# Patient Record
Sex: FEMALE | Race: WHITE | URBAN - METROPOLITAN AREA
[De-identification: names, ages, dates, MRNs, and addresses within clinical notes are randomized per-mention and may not be internally consistent; named-entity substitution may affect disease eponyms.]

---

## 2017-01-09 ENCOUNTER — TRANSFERRED RECORDS (OUTPATIENT)
Dept: HEALTH INFORMATION MANAGEMENT | Facility: CLINIC | Age: 47
End: 2017-01-09

## 2017-10-07 ENCOUNTER — TRANSFERRED RECORDS (OUTPATIENT)
Dept: HEALTH INFORMATION MANAGEMENT | Facility: CLINIC | Age: 47
End: 2017-10-07

## 2017-10-11 ENCOUNTER — TRANSFERRED RECORDS (OUTPATIENT)
Dept: HEALTH INFORMATION MANAGEMENT | Facility: CLINIC | Age: 47
End: 2017-10-11

## 2017-12-11 ENCOUNTER — TRANSFERRED RECORDS (OUTPATIENT)
Dept: HEALTH INFORMATION MANAGEMENT | Facility: CLINIC | Age: 47
End: 2017-12-11

## 2017-12-26 ENCOUNTER — TRANSFERRED RECORDS (OUTPATIENT)
Dept: HEALTH INFORMATION MANAGEMENT | Facility: CLINIC | Age: 47
End: 2017-12-26

## 2018-02-06 ENCOUNTER — MEDICAL CORRESPONDENCE (OUTPATIENT)
Dept: HEALTH INFORMATION MANAGEMENT | Facility: CLINIC | Age: 48
End: 2018-02-06

## 2018-02-08 ENCOUNTER — MEDICAL CORRESPONDENCE (OUTPATIENT)
Dept: HEALTH INFORMATION MANAGEMENT | Facility: CLINIC | Age: 48
End: 2018-02-08

## 2018-02-28 DIAGNOSIS — H53.10 SUBJECTIVE VISUAL DISTURBANCE: Primary | ICD-10-CM

## 2018-03-01 ENCOUNTER — OFFICE VISIT (OUTPATIENT)
Dept: OPHTHALMOLOGY | Facility: CLINIC | Age: 48
End: 2018-03-01
Attending: OPHTHALMOLOGY
Payer: MEDICARE

## 2018-03-01 DIAGNOSIS — H46.9 OPTIC NEUROPATHY: Primary | ICD-10-CM

## 2018-03-01 DIAGNOSIS — H57.11 PAIN IN OR AROUND EYE, RIGHT: ICD-10-CM

## 2018-03-01 DIAGNOSIS — H53.10 SUBJECTIVE VISUAL DISTURBANCE: ICD-10-CM

## 2018-03-01 PROCEDURE — 92133 CPTRZD OPH DX IMG PST SGM ON: CPT | Mod: ZF | Performed by: OPHTHALMOLOGY

## 2018-03-01 PROCEDURE — 92083 EXTENDED VISUAL FIELD XM: CPT | Mod: ZF | Performed by: OPHTHALMOLOGY

## 2018-03-01 PROCEDURE — G0463 HOSPITAL OUTPT CLINIC VISIT: HCPCS | Mod: ZF | Performed by: TECHNICIAN/TECHNOLOGIST

## 2018-03-01 ASSESSMENT — VISUAL ACUITY
OS_CC+: -3
OS_CC: 20/25
CORRECTION_TYPE: GLASSES
METHOD: SNELLEN - LINEAR
OD_CC: 20/30

## 2018-03-01 ASSESSMENT — REFRACTION_WEARINGRX
OS_CYLINDER: +0.25
OD_SPHERE: -2.75
OD_CYLINDER: SPHERE
OS_SPHERE: -3.00
OS_AXIS: 150

## 2018-03-01 ASSESSMENT — TONOMETRY
OS_IOP_MMHG: 15
IOP_METHOD: TONOPEN
OD_IOP_MMHG: 15

## 2018-03-01 ASSESSMENT — SLIT LAMP EXAM - LIDS
COMMENTS: NORMAL
COMMENTS: NORMAL

## 2018-03-01 ASSESSMENT — CUP TO DISC RATIO
OD_RATIO: 0.8
OS_RATIO: 0.8

## 2018-03-01 ASSESSMENT — CONF VISUAL FIELD
METHOD: COUNTING FINGERS
OD_NORMAL: 1
OS_NORMAL: 1

## 2018-03-01 ASSESSMENT — EXTERNAL EXAM - LEFT EYE: OS_EXAM: NORMAL

## 2018-03-01 ASSESSMENT — EXTERNAL EXAM - RIGHT EYE: OD_EXAM: 1 MM PTOSIS

## 2018-03-01 NOTE — MR AVS SNAPSHOT
After Visit Summary   3/1/2018    Gretel Arce    MRN: [id number]           Patient Information     Date Of Birth          1970        Visit Information        Provider Department      3/1/2018 10:30 AM Maciej Osorio [facility]        Today's Diagnoses     Optic neuropathy - Left Eye    -  1    Subjective visual disturbance        Pain in or around eye, right          Care Instructions    You have been diagnosed with Dry eye syndrome.  Symptoms of Dry eye syndrome can include double vision, eye pain, blurry vision, stinging, burning, tearing, eye redness.      Some useful instructions are listed below:     1.  Use artificial tears on a regular basis.  If you use artificial tear drops with preservative, you can use them up to 4-6 times per day. If you use artificial tear drops without preservatives, then you can use them more often.      2.  Wash your eyelashes with hot water.  Do not make the water so hot that you burn yourself, but the water should be more than just warm.  Often patients will wash their eyelashes in the shower under the hot water.  You should rub gently along the base of your eyelashes.      3.  Taking fish oil capsules twice a day for a month and then once a day after that can help improve Dry eye symptoms.      4.  Put lacrilube or refresh pm in your eyes before bedtime.      5.  If this is not beneficial, other treatments can include punctal plugs or cautery, corticosteroid eye drops, Restasis, Lipiflow, or autologous serum.  If you are still struggling with dry eye symptoms, call Dr. Osorio's office for other therapies or a referral to a dry eye specialist.                 Follow-ups after your visit        Who to contact     Please call your clinic at [phone] to:    Ask questions about your health    Make or cancel appointments    Discuss your medicines    Learn about your test results    Speak to your doctor            Additional Information About Your  Visit        [facility] Information     [facility] gives you secure access to your electronic health record. If you see a primary care provider, you can also send messages to your care team and make appointments. If you have questions, please call your primary care clinic.  If you do not have a primary care provider, please call [phone] and they will assist you.      [facility] is an electronic gateway that provides easy, online access to your medical records. With [facility], you can request a clinic appointment, read your test results, renew a prescription or communicate with your care team.     To access your existing account, please contact your [facility] or call [phone] for assistance.        Care EveryWhere ID     This is your Care EveryWhere ID. This could be used by other organizations to access your [place] medical records  [id number]         Blood Pressure from Last 3 Encounters:   08/16/16 123/80   05/26/16 146/75   05/16/16 100/64    Weight from Last 3 Encounters:   08/16/16 92.3 kg (203 lb 6.4 oz)   05/26/16 93.7 kg (206 lb 8 oz)   05/16/16 93.4 kg (206 lb)              We Performed the Following     Glaucoma Top OU     IOP Measurement     OCT Optic Nerve RNFL Spectralis OU (both eyes)          Today's Medication Changes          These changes are accurate as of 3/1/18 11:55 AM.  If you have any questions, ask your nurse or doctor.               Stop taking these medicines if you haven't already. Please contact your care team if you have questions.     ACCU-CHEK JOANNE PLUS test strip   Generic drug:  blood glucose monitoring   Stopped by:  Maciej Osorio MD           B-D U/F 31G X 8 MM   Generic drug:  insulin pen needle   Stopped by:  Maciej Osorio MD           loratadine 10 MG tablet   Commonly known as:  CLARITIN   Stopped by:  Maciej Osorio MD           NOVOLOG MIX 70/30 SC   Stopped by:  Maciej Osorio MD           VICTOZA SC    Stopped by:  Maciej Osorio MD                    Primary Care Provider Office Phone # Fax #    [facility] Wilson Tj Upton [phone]       [address] [place] [address] [address] [place] [address]        Equal Access to Services     ALICECHARY CANDACE : Hadii katerina coles dwayneo Sojuliali, waaxda luqadaha, qaybta kaalmada adeegyada, waxanastacia lorenain haysridharn gemmamike piedra arleen ramos. So [facility] [phone].    ATENCIÓN: Si habla español, tiene a botello disposición servicios gratuitos de asistencia lingüística. [facility] [phone].    We comply with applicable federal civil rights laws and [place] laws. We do not discriminate on the basis of race, color, national origin, age, disability, sex, sexual orientation, or gender identity.            Thank you!     Thank you for choosing [facility]  for your care. Our goal is always to provide you with excellent care. Hearing back from our patients is one way we can continue to improve our services. Please take a few minutes to complete the written survey that you may receive in the mail after your visit with us. Thank you!             Your Updated Medication List - Protect others around you: Learn how to safely use, store and throw away your medicines at www.disposemymeds.org.          This list is accurate as of 3/1/18 11:55 AM.  Always use your most recent med list.                   Brand Name Dispense Instructions for use Diagnosis    ACETAMINOPHEN EXTRA STRENGTH 500 MG tablet   Generic drug:  acetaminophen      Take 1-2 tablets by mouth 4 times daily as needed        amLODIPine 5 MG tablet    NORVASC     Take 1 tablet by mouth daily. Needs appointment for additional refills        aspirin 81 MG tablet      Take 1 tablet by mouth daily.        ATORVASTATIN CALCIUM PO      Take 80 mg by mouth daily        buPROPion 150 MG 24 hr tablet    WELLBUTRIN XL     Take 150 mg by mouth every morning Take three tablets in AM        CALTRATE 600+D 600-400 MG-UNIT per tablet   Generic  drug:  calcium-vitamin D      Take as directed per package instructions        cetirizine 10 MG tablet    zyrTEC     Take 20 mg by mouth daily        clonazePAM 0.5 MG tablet    klonoPIN     0.5 mg Take 1-2 tablets as needed.        clotrimazole 1 % cream    LOTRIMIN     Take inch apply as needed        fluocinonide 0.05 % cream    LIDEX     Apply topically 2 times daily as needed        FLUTICASONE PROPIONATE (NASAL) NA      Spray 2 sprays in nostril        ibuprofen 800 MG tablet    ADVIL/MOTRIN     Take 1 tablet by mouth every 8 hours as needed        ketoconazole 2 % shampoo    NIZORAL     Apply topically daily as needed for itching or irritation        LEXAPRO 20 MG tablet   Generic drug:  escitalopram      Take 40 mg by mouth daily        lisinopril 40 MG tablet    PRINIVIL/ZESTRIL     Take 1 tablet by mouth daily.        magnesium oxide 400 MG tablet    MAG-OX     Take 2 tablets by mouth daily.        metFORMIN 1000 MG tablet    GLUCOPHAGE     Take 1 tablet by mouth 2 times daily.        metoprolol tartrate 25 MG tablet    LOPRESSOR     Take 50 mg by mouth 2 times daily        multivitamin per tablet      Take 1 tablet by mouth daily.        NEXIUM PO      Take 20 mg by mouth        PROAIR  (90 BASE) MCG/ACT Inhaler   Generic drug:  albuterol      Inhale 2 puffs into the lungs. Every 3 to 6 hours as needed        ROZEREM 8 MG tablet   Generic drug:  ramelteon      Take 32 mg by mouth At Bedtime        TRAZODONE HCL PO      Take 200 mg by mouth At Bedtime        VITAMIN D3 PO      Take 1,000 Units by mouth daily

## 2018-03-01 NOTE — NURSING NOTE
Chief Complaints and History of Present Illnesses   Patient presents with     New Patient     referred for eye pain and vision changes      HPI    Symptoms:              Comments:  Referred for eye pain and vision changes s/p left optic decompression and aneurysm wrapped.     Patient states pain behind RIGHT eye and face after surgery.  Persisting pain on right side and no relief with ibuprofen or tylenol per patient.   Stinging sensation in right eye per patient.   Patient states she is shaping shapes out of her LEFT eye after surgery.  Occipital neuralgia diagnosed recently.   Denies double vision.   NURY Cervantes 3/1/2018 10:34 AM

## 2018-03-01 NOTE — PROGRESS NOTES
Assessment & Plan     Gretel Arce is a 47 year old female with the following diagnoses:   1. Optic neuropathy - Left Eye    2. Subjective visual disturbance    3. Pain in or around eye, right      She is complaining of supraorbital right sided pain and foreign body sensation since surgery. It is 8/10 in severity. It is always present, but improves when closing eyes. She notices a temporal clouding in her left eye when prompted. On 12/11/17 she had a right orbital cranial approach with decompresion of the left optic nerve and microsurgical clipping of paraclinioid aneurysm. Symptoms began in the fall after severe headache. 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   She also has a history of occiptial neuralgia and she is receiving injection tomorrow.     On exam, vision is 20/30 in the right, 20/25- in the left with myopic correction. Her extraocular motility is full. She has anisicoria with a smaller right pupil than left. She has a left afferent pupillary defect. Fields are full to confrontation. She has decreased color plates in the left eye 9/11. She has full color plates in the right.  On exam, She has mild ptosis of the right eyelid. she has mild punctate epithelial erosions in her right eye inferiorly which is improved with proparacaine. Her dilated exam is significant for 0.7 cup to disc ratio bilaterally. She has trace pallor bilaterally.     OCT indicates normal right eye and diffuse thinning in the left eye. Visual field shows nonspecific superior defect in the right eye and  circumferential constriction in the left eye. N    I believe her eye pain symptoms are surface related based on her complete resolution with proparacaine. I recommend aggressive lubrication, warm compress, fish oil supplements, lid scrubs and humidified air.     Incidentally, she has a left optic neuropathy.  She was not symptomatic so we do not know if this occurred from the aneurysm or  aneurysm repair.  In either case, she is not bothered by it, and we have established a new baseline for her should things change in the future.  Recommend follow up 1 year sooner as needed for worsening symptoms.             Attending Physician Attestation:  Complete documentation of historical and exam elements from today's encounter can be found in the full encounter summary report (not reduplicated in this progress note).  I personally obtained the chief complaint(s) and history of present illness.  I confirmed and edited as necessary the review of systems, past medical/surgical history, family history, social history, and examination findings as documented by others; and I examined the patient myself.  I personally reviewed the relevant tests, images, and reports as documented above.  I formulated and edited as necessary the assessment and plan and discussed the findings and management plan with the patient and family. - Maciej So MD  PGY3, Dept of Ophthalmology  Pager [phone]

## 2018-03-01 NOTE — LETTER
3/1/2018        Enio Mcmahon MD     RE:  :  MRN: Gretel Arce  1970  [id number]     Dear Dr. Mcmahon:    Thank you for asking me to see your very pleasant patient, Gretel Arce, in neuro-ophthalmic consultation.  I would like to thank you for sending your records and I have summarized them in the history of present illness.  My assessment and plan are below.  For further details, please see my attached clinic note.      Assessment & Plan   Gretel Arce is a 47 year old female with the following diagnoses:   1. Optic neuropathy - Left Eye    2. Subjective visual disturbance    3. Pain in or around eye, right      She is complaining of supraorbital right sided pain and foreign body sensation since surgery. It is 8/10 in severity. It is always present, but improves when closing eyes. She notices a temporal clouding in her left eye when prompted. On 17 she had a right orbital cranial approach with decompresion of the left optic nerve and microsurgical clipping of paraclinioid aneurysm. Symptoms began in the fall after severe headache. 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   She also has a history of occiptial neuralgia and she is receiving injection tomorrow.     On exam, vision is 20/30 in the right, 20/25- in the left with myopic correction. Her extraocular motility is full. She has a left afferent pupillary defect. Fields are full to confrontation. She has decreased color plates in the left eye . She has full color plates in the right.  On exam, she has mild punctate epithelial erosions in her right eye inferiorly which is improved with proparacaine. Her dilated exam is significant for 0.7 cup to disc ratio bilaterally. She has trace pallor bilaterally.     OCT indicates normal right eye and diffuse thinning in the left eye. Visual field shows nonspecific superior defect in the right eye and  circumferential constriction in the left eye.  N    I believe her eye pain symptoms are surface related based on her complete resolution with proparacaine. I recommend aggressive lubrication, warm compress, fish oil supplements, lid scrubs and humidified air.     Incidentally, she has a left optic neuropathy.  She was not symptomatic so we do not know if this occurred from the aneurysm or aneurysm repair.  In either case, she is not bothered by it, and we have established a new baseline for her should things change in the future.  Recommend follow up 1 year sooner as needed for worsening symptoms.      Again, thank you for allowing me to participate in the care of your patient.      Sincerely,    Maciej Osorio MD  Professor, Neuro-Ophthalmology  Department of Ophthalmology and Visual Neurosciences  [facility]    CC:  Heaven Upton MD    DX = eye pain, Dry eye syndrome, optic neuropathy, ophthalmic artery aneurysm

## 2018-03-01 NOTE — PATIENT INSTRUCTIONS
You have been diagnosed with Dry eye syndrome.  Symptoms of Dry eye syndrome can include double vision, eye pain, blurry vision, stinging, burning, tearing, eye redness.      Some useful instructions are listed below:     1.  Use artificial tears on a regular basis.  If you use artificial tear drops with preservative, you can use them up to 4-6 times per day. If you use artificial tear drops without preservatives, then you can use them more often.      2.  Wash your eyelashes with hot water.  Do not make the water so hot that you burn yourself, but the water should be more than just warm.  Often patients will wash their eyelashes in the shower under the hot water.  You should rub gently along the base of your eyelashes.      3.  Taking fish oil capsules twice a day for a month and then once a day after that can help improve Dry eye symptoms.      4.  Put lacrilube or refresh pm in your eyes before bedtime.      5.  If this is not beneficial, other treatments can include punctal plugs or cautery, corticosteroid eye drops, Restasis, Lipiflow, or autologous serum.  If you are still struggling with dry eye symptoms, call Dr. Osorio's office for other therapies or a referral to a dry eye specialist.

## 2018-11-02 ENCOUNTER — MYC MEDICAL ADVICE (OUTPATIENT)
Dept: OPHTHALMOLOGY | Facility: CLINIC | Age: 48
End: 2018-11-02

## 2018-11-02 NOTE — TELEPHONE ENCOUNTER
Reviewed with Dr. Neal  Recommend eval/treatment for dry eyes with comprehensive    Scheduled next week on pt preferred weds with Dr. Domingo Morocho RN 4:40 PM 11/02/18

## 2018-11-07 ENCOUNTER — OFFICE VISIT (OUTPATIENT)
Dept: OPHTHALMOLOGY | Facility: CLINIC | Age: 48
End: 2018-11-07
Attending: OPHTHALMOLOGY
Payer: MEDICARE

## 2018-11-07 DIAGNOSIS — H04.123 DRY EYE SYNDROME, BILATERAL: Primary | ICD-10-CM

## 2018-11-07 PROCEDURE — 68761 CLOSE TEAR DUCT OPENING: CPT | Mod: ZF | Performed by: STUDENT IN AN ORGANIZED HEALTH CARE EDUCATION/TRAINING PROGRAM

## 2018-11-07 PROCEDURE — G0463 HOSPITAL OUTPT CLINIC VISIT: HCPCS | Mod: ZF

## 2018-11-07 ASSESSMENT — REFRACTION_WEARINGRX
OD_CYLINDER: +0.00
OS_CYLINDER: +0.25
OS_SPHERE: -3.00
OD_ADD: +0.00
OD_SPHERE: -2.75
OS_AXIS: 150
OD_AXIS: 000

## 2018-11-07 ASSESSMENT — TONOMETRY
IOP_METHOD: TONOPEN
OD_IOP_MMHG: 18
OS_IOP_MMHG: 20

## 2018-11-07 ASSESSMENT — CONF VISUAL FIELD
OD_NORMAL: 1
OS_NORMAL: 1

## 2018-11-07 ASSESSMENT — SLIT LAMP EXAM - LIDS
COMMENTS: NORMAL
COMMENTS: NORMAL

## 2018-11-07 ASSESSMENT — VISUAL ACUITY
OD_CC: 20/25
OS_CC+: -2
METHOD: SNELLEN - LINEAR
OD_CC+: +1
CORRECTION_TYPE: GLASSES
OS_CC: 20/25

## 2018-11-07 ASSESSMENT — EXTERNAL EXAM - RIGHT EYE: OD_EXAM: 1 MM PTOSIS

## 2018-11-07 ASSESSMENT — EXTERNAL EXAM - LEFT EYE: OS_EXAM: NORMAL

## 2018-11-07 NOTE — PROGRESS NOTES
HPI: Gretel Arce is a 48 year old female who presents for general visit. She was referred by Dr. Osorio for evaluation of dry eyes. She notes that it was worse since her surgery in December 2017. Reports FBS, itching, and scratching. Denies change in vision, diplopia, flashes/floaters, xerostomia.     POHx:  Left paraophthalmic ICA aneurysm s/p clipping    Current Eye Medications:   Artificial tears four times daily both eyes     Review of Testing:  NA    Assessment & Plan   Gretel Arce is a 48 year old female with the following diagnoses:     1. Dry eye syndrome, bilateral:   -Using Artificial  Tears four times daily both eyes and warm compresses twice daily both eyes    -Discussed humidifier use   -Continue artificial tears and warm compresses   -Lower punctal plugs placed    Return to clinic in 3 months. Discussed with Dr. Cornejo.    Maciej Lane MD  Ophthalmology, PGY-4    Teaching statement:  Complete documentation of historical and exam elements from today's encounter can be found in the full encounter summary report (not reduplicated in this progress note). I personally obtained the chief complaint(s) and history of present illness.  I confirmed and edited as necessary the review of systems, past medical/surgical history, family history, social history, and examination findings as documented by others; and I examined the patient myself. I personally reviewed the relevant tests, images, and reports as documented above.     I formulated and edited as necessary the assessment and plan and discussed the findings and management plan with the patient and family.    Ingris Cornejo MD  Comprehensive Ophthalmology & Ocular Pathology  Department of Ophthalmology and Visual Neurosciences  [email or web address]  Pager [phone]

## 2018-11-07 NOTE — MR AVS SNAPSHOT
After Visit Summary   11/7/2018    Gretel Arce    MRN: [id number]           Patient Information     Date Of Birth          1970        Visit Information        Provider Department      11/7/2018 12:45 PM Maciej Lane MD [facility]        Today's Diagnoses     Dry eye syndrome, bilateral    -  1       Follow-ups after your visit        Follow-up notes from your care team     Return in about 3 months (around 2/7/2019) for Follow Up; resident cc.      Who to contact     Please call your clinic at [phone] to:    Ask questions about your health    Make or cancel appointments    Discuss your medicines    Learn about your test results    Speak to your doctor            Additional Information About Your Visit        [facility] Information     [facility] gives you secure access to your electronic health record. If you see a primary care provider, you can also send messages to your care team and make appointments. If you have questions, please call your primary care clinic.  If you do not have a primary care provider, please call [phone] and they will assist you.      [facility] is an electronic gateway that provides easy, online access to your medical records. With [facility], you can request a clinic appointment, read your test results, renew a prescription or communicate with your care team.     To access your existing account, please contact your [facility] or call [phone] for assistance.        Care EveryWhere ID     This is your Care EveryWhere ID. This could be used by other organizations to access your [place] medical records  [id number]         Blood Pressure from Last 3 Encounters:   08/16/16 123/80   05/26/16 146/75   05/16/16 100/64    Weight from Last 3 Encounters:   08/16/16 92.3 kg (203 lb 6.4 oz)   05/26/16 93.7 kg (206 lb 8 oz)   05/16/16 93.4 kg (206 lb)              We Performed the Following     Punctal Closure, Plugs        Primary  Care Provider Office Phone # Fax #    [facility] Katie Upton [phone] [address] [facility] [address]        Equal Access to Services     MARLENA MARIA : Hadcelso katerina coles dwayneo Sojuliali, waaxda luqadaha, qaybta kaalmada adegeoffrey, ramo coultercitlaly ramos. So [facility] [phone].    ATENCIÓN: Si habla español, tiene a botello disposición servicios gratuitos de asistencia lingüística. Llame al [phone].    We comply with applicable federal civil rights laws and [place] laws. We do not discriminate on the basis of race, color, national origin, age, disability, sex, sexual orientation, or gender identity.            Thank you!     Thank you for choosing [facility]  for your care. Our goal is always to provide you with excellent care. Hearing back from our patients is one way we can continue to improve our services. Please take a few minutes to complete the written survey that you may receive in the mail after your visit with us. Thank you!             Your Updated Medication List - Protect others around you: Learn how to safely use, store and throw away your medicines at www.disposemymeds.org.          This list is accurate as of 11/7/18  5:54 PM.  Always use your most recent med list.                   Brand Name Dispense Instructions for use Diagnosis    ACETAMINOPHEN EXTRA STRENGTH 500 MG tablet   Generic drug:  acetaminophen      Take 1-2 tablets by mouth 4 times daily as needed        amLODIPine 5 MG tablet    NORVASC     Take 1 tablet by mouth daily. Needs appointment for additional refills        aspirin 81 MG tablet      Take 1 tablet by mouth daily.        ATORVASTATIN CALCIUM PO      Take 80 mg by mouth daily        buPROPion 150 MG 24 hr tablet    WELLBUTRIN XL     Take 150 mg by mouth every morning Take three tablets in AM        CALTRATE 600+D 600-400 MG-UNIT per tablet   Generic drug:  calcium carbonate 600 mg-vitamin D 400 units      Take as directed per  package instructions        cetirizine 10 MG tablet    zyrTEC     Take 20 mg by mouth daily        clonazePAM 0.5 MG tablet    klonoPIN     0.5 mg Take 1-2 tablets as needed.        clotrimazole 1 % cream    LOTRIMIN     Take inch apply as needed        fluocinonide 0.05 % cream    LIDEX     Apply topically 2 times daily as needed        FLUTICASONE PROPIONATE (NASAL) NA      Spray 2 sprays in nostril        ibuprofen 800 MG tablet    ADVIL/MOTRIN     Take 1 tablet by mouth every 8 hours as needed        ketoconazole 2 % shampoo    NIZORAL     Apply topically daily as needed for itching or irritation        LEXAPRO 20 MG tablet   Generic drug:  escitalopram      Take 40 mg by mouth daily        lisinopril 40 MG tablet    PRINIVIL/ZESTRIL     Take 1 tablet by mouth daily.        magnesium oxide 400 MG tablet    MAG-OX     Take 2 tablets by mouth daily.        metFORMIN 1000 MG tablet    GLUCOPHAGE     Take 1 tablet by mouth 2 times daily.        metoprolol tartrate 25 MG tablet    LOPRESSOR     Take 50 mg by mouth 2 times daily        multivitamin per tablet      Take 1 tablet by mouth daily.        NEXIUM PO      Take 20 mg by mouth        PROAIR  (90 Base) MCG/ACT inhaler   Generic drug:  albuterol      Inhale 2 puffs into the lungs. Every 3 to 6 hours as needed        ROZEREM 8 MG tablet   Generic drug:  ramelteon      Take 32 mg by mouth At Bedtime        TRAZODONE HCL PO      Take 200 mg by mouth At Bedtime        VITAMIN D3 PO      Take 1,000 Units by mouth daily

## 2018-11-07 NOTE — NURSING NOTE
Chief Complaints and History of Present Illnesses   Patient presents with     Consult For     KENZIE      HPI    Symptoms:     Foreign body sensation   Itching   Burning      Duration:  1 year   Frequency:  Constant, Daily       Do you have eye pain now?:  No      Comments:  Pt complains of constant dryness. A lot of burning and itching. Using At's and warm compresses, with no relief. Vision seems stable with current glasses. Noticing more end of day, like something in the eyes. TINA CARVALHO, COA 12:38 PM 11/07/2018

## 2018-11-08 ENCOUNTER — MYC MEDICAL ADVICE (OUTPATIENT)
Dept: OPHTHALMOLOGY | Facility: CLINIC | Age: 48
End: 2018-11-08

## 2019-04-12 ENCOUNTER — RECORDS - HEALTHEAST (OUTPATIENT)
Dept: ADMINISTRATIVE | Facility: OTHER | Age: 49
End: 2019-04-12

## 2019-05-31 ENCOUNTER — HOSPITAL ENCOUNTER (OUTPATIENT)
Dept: MRI IMAGING | Facility: HOSPITAL | Age: 49
Discharge: HOME | End: 2019-05-31
Attending: PSYCHIATRY & NEUROLOGY

## 2019-10-02 ENCOUNTER — HEALTH MAINTENANCE LETTER (OUTPATIENT)
Age: 49
End: 2019-10-02

## 2019-12-16 ENCOUNTER — HEALTH MAINTENANCE LETTER (OUTPATIENT)
Age: 49
End: 2019-12-16

## 2021-01-15 ENCOUNTER — HEALTH MAINTENANCE LETTER (OUTPATIENT)
Age: 51
End: 2021-01-15

## 2021-05-27 ENCOUNTER — RECORDS - HEALTHEAST (OUTPATIENT)
Dept: ADMINISTRATIVE | Facility: CLINIC | Age: 51
End: 2021-05-27

## 2021-05-28 ENCOUNTER — RECORDS - HEALTHEAST (OUTPATIENT)
Dept: ADMINISTRATIVE | Facility: CLINIC | Age: 51
End: 2021-05-28

## 2021-05-29 ENCOUNTER — RECORDS - HEALTHEAST (OUTPATIENT)
Dept: ADMINISTRATIVE | Facility: CLINIC | Age: 51
End: 2021-05-29

## 2021-05-30 ENCOUNTER — RECORDS - HEALTHEAST (OUTPATIENT)
Dept: ADMINISTRATIVE | Facility: CLINIC | Age: 51
End: 2021-05-30

## 2021-09-04 ENCOUNTER — HEALTH MAINTENANCE LETTER (OUTPATIENT)
Age: 51
End: 2021-09-04

## 2022-02-19 ENCOUNTER — HEALTH MAINTENANCE LETTER (OUTPATIENT)
Age: 52
End: 2022-02-19

## 2022-10-22 ENCOUNTER — HEALTH MAINTENANCE LETTER (OUTPATIENT)
Age: 52
End: 2022-10-22

## 2023-04-01 ENCOUNTER — HEALTH MAINTENANCE LETTER (OUTPATIENT)
Age: 53
End: 2023-04-01